# Patient Record
Sex: FEMALE | Race: WHITE | Employment: FULL TIME | URBAN - METROPOLITAN AREA
[De-identification: names, ages, dates, MRNs, and addresses within clinical notes are randomized per-mention and may not be internally consistent; named-entity substitution may affect disease eponyms.]

---

## 2017-05-17 LAB
ABO/RH: NORMAL
ANTIBODY SCREEN: NORMAL
HEPATITIS C ANTIBODY INTERPRETATION: NORMAL

## 2017-05-18 ENCOUNTER — HOSPITAL ENCOUNTER (OUTPATIENT)
Dept: SURGERY | Age: 27
Discharge: HOME | End: 2017-05-18
Attending: OBSTETRICS & GYNECOLOGY | Admitting: OBSTETRICS & GYNECOLOGY

## 2017-05-18 VITALS
HEART RATE: 89 BPM | SYSTOLIC BLOOD PRESSURE: 109 MMHG | DIASTOLIC BLOOD PRESSURE: 77 MMHG | OXYGEN SATURATION: 97 % | TEMPERATURE: 97.8 F | RESPIRATION RATE: 18 BRPM

## 2017-05-18 LAB
BASOPHILS ABSOLUTE: 0 K/UL (ref 0–0.2)
BASOPHILS RELATIVE PERCENT: 0.4 %
EOSINOPHILS ABSOLUTE: 0.2 K/UL (ref 0–0.6)
EOSINOPHILS RELATIVE PERCENT: 2.6 %
HCT VFR BLD CALC: 39 % (ref 36–48)
HEMOGLOBIN: 12.5 G/DL (ref 12–16)
HEPATITIS B SURFACE ANTIGEN INTERPRETATION: ABNORMAL
LYMPHOCYTES ABSOLUTE: 1.9 K/UL (ref 1–5.1)
LYMPHOCYTES RELATIVE PERCENT: 30.5 %
MCH RBC QN AUTO: 27.9 PG (ref 26–34)
MCHC RBC AUTO-ENTMCNC: 32 G/DL (ref 31–36)
MCV RBC AUTO: 87.1 FL (ref 80–100)
MONOCYTES ABSOLUTE: 0.5 K/UL (ref 0–1.3)
MONOCYTES RELATIVE PERCENT: 8.4 %
NEUTROPHILS ABSOLUTE: 3.6 K/UL (ref 1.7–7.7)
NEUTROPHILS RELATIVE PERCENT: 58.1 %
PDW BLD-RTO: 17.8 % (ref 12.4–15.4)
PLATELET # BLD: 242 K/UL (ref 135–450)
PMV BLD AUTO: 8.6 FL (ref 5–10.5)
RBC # BLD: 4.48 M/UL (ref 4–5.2)
RPR: ABNORMAL
RUBELLA ANTIBODY IGG: 23 IU/ML
WBC # BLD: 6.1 K/UL (ref 4–11)

## 2017-05-18 RX ORDER — DIPHENHYDRAMINE HYDROCHLORIDE 50 MG/ML
12.5 INJECTION INTRAMUSCULAR; INTRAVENOUS
Status: ACTIVE | OUTPATIENT
Start: 2017-05-18 | End: 2017-05-18

## 2017-05-18 RX ORDER — MEPERIDINE HYDROCHLORIDE 25 MG/ML
12.5 INJECTION INTRAMUSCULAR; INTRAVENOUS; SUBCUTANEOUS EVERY 5 MIN PRN
Status: DISCONTINUED | OUTPATIENT
Start: 2017-05-18 | End: 2017-05-19 | Stop reason: HOSPADM

## 2017-05-18 RX ORDER — IBUPROFEN 800 MG/1
800 TABLET ORAL EVERY 6 HOURS PRN
Qty: 40 TABLET | Refills: 3 | Status: ON HOLD | OUTPATIENT
Start: 2017-05-18 | End: 2020-08-21

## 2017-05-18 RX ORDER — LIDOCAINE HYDROCHLORIDE 10 MG/ML
0.5 INJECTION, SOLUTION EPIDURAL; INFILTRATION; INTRACAUDAL; PERINEURAL ONCE
Status: DISCONTINUED | OUTPATIENT
Start: 2017-05-18 | End: 2017-05-19 | Stop reason: HOSPADM

## 2017-05-18 RX ORDER — METHYLERGONOVINE MALEATE 0.2 MG/1
0.2 TABLET ORAL 3 TIMES DAILY
Qty: 15 TABLET | Refills: 0 | Status: ON HOLD | OUTPATIENT
Start: 2017-05-18 | End: 2020-08-21

## 2017-05-18 RX ORDER — SODIUM CHLORIDE, SODIUM LACTATE, POTASSIUM CHLORIDE, CALCIUM CHLORIDE 600; 310; 30; 20 MG/100ML; MG/100ML; MG/100ML; MG/100ML
INJECTION, SOLUTION INTRAVENOUS CONTINUOUS
Status: DISCONTINUED | OUTPATIENT
Start: 2017-05-18 | End: 2017-05-19 | Stop reason: HOSPADM

## 2017-05-18 RX ORDER — CEFAZOLIN SODIUM 2 G/100ML
2 INJECTION, SOLUTION INTRAVENOUS
Status: COMPLETED | OUTPATIENT
Start: 2017-05-18 | End: 2017-05-18

## 2017-05-18 RX ORDER — OXYCODONE HYDROCHLORIDE AND ACETAMINOPHEN 5; 325 MG/1; MG/1
1 TABLET ORAL PRN
Status: COMPLETED | OUTPATIENT
Start: 2017-05-18 | End: 2017-05-18

## 2017-05-18 RX ORDER — HYDROMORPHONE HCL 110MG/55ML
0.5 PATIENT CONTROLLED ANALGESIA SYRINGE INTRAVENOUS EVERY 5 MIN PRN
Status: DISCONTINUED | OUTPATIENT
Start: 2017-05-18 | End: 2017-05-19 | Stop reason: HOSPADM

## 2017-05-18 RX ORDER — ONDANSETRON 2 MG/ML
4 INJECTION INTRAMUSCULAR; INTRAVENOUS
Status: ACTIVE | OUTPATIENT
Start: 2017-05-18 | End: 2017-05-18

## 2017-05-18 RX ORDER — OXYCODONE HYDROCHLORIDE AND ACETAMINOPHEN 5; 325 MG/1; MG/1
1 TABLET ORAL EVERY 6 HOURS PRN
Qty: 12 TABLET | Refills: 0 | Status: SHIPPED | OUTPATIENT
Start: 2017-05-18 | End: 2017-05-25

## 2017-05-18 RX ORDER — FENTANYL CITRATE 50 UG/ML
25 INJECTION, SOLUTION INTRAMUSCULAR; INTRAVENOUS EVERY 5 MIN PRN
Status: DISCONTINUED | OUTPATIENT
Start: 2017-05-18 | End: 2017-05-19 | Stop reason: HOSPADM

## 2017-05-18 RX ORDER — LABETALOL HYDROCHLORIDE 5 MG/ML
5 INJECTION, SOLUTION INTRAVENOUS EVERY 10 MIN PRN
Status: DISCONTINUED | OUTPATIENT
Start: 2017-05-18 | End: 2017-05-19 | Stop reason: HOSPADM

## 2017-05-18 RX ORDER — OXYCODONE HYDROCHLORIDE AND ACETAMINOPHEN 5; 325 MG/1; MG/1
2 TABLET ORAL PRN
Status: COMPLETED | OUTPATIENT
Start: 2017-05-18 | End: 2017-05-18

## 2017-05-18 RX ADMIN — CEFAZOLIN SODIUM 2 G: 2 INJECTION, SOLUTION INTRAVENOUS at 13:33

## 2017-05-18 RX ADMIN — SODIUM CHLORIDE, SODIUM LACTATE, POTASSIUM CHLORIDE, CALCIUM CHLORIDE: 600; 310; 30; 20 INJECTION, SOLUTION INTRAVENOUS at 12:46

## 2017-05-18 RX ADMIN — OXYCODONE HYDROCHLORIDE AND ACETAMINOPHEN 1 TABLET: 5; 325 TABLET ORAL at 15:06

## 2017-05-18 ASSESSMENT — PAIN DESCRIPTION - DESCRIPTORS: DESCRIPTORS: CONSTANT;CRAMPING

## 2017-05-18 ASSESSMENT — PAIN SCALES - GENERAL: PAINLEVEL_OUTOF10: 4

## 2017-05-18 ASSESSMENT — PAIN - FUNCTIONAL ASSESSMENT: PAIN_FUNCTIONAL_ASSESSMENT: 0-10

## 2017-05-18 ASSESSMENT — PAIN DESCRIPTION - PAIN TYPE: TYPE: SURGICAL PAIN

## 2017-05-19 LAB — HIV-1 AND HIV-2 ANTIBODIES: NEGATIVE

## 2017-07-03 ENCOUNTER — HOSPITAL ENCOUNTER (OUTPATIENT)
Dept: OTHER | Age: 27
Discharge: HOME | End: 2017-07-03
Attending: OBSTETRICS & GYNECOLOGY | Admitting: OBSTETRICS & GYNECOLOGY

## 2017-07-03 LAB — HOMOCYSTEINE: 8 UMOL/L (ref 0–10)

## 2017-07-05 LAB
ACTIVATED PROTEIN C RESISTANCE: 4.6
ANTICARDIOLIPIN IGG ANTIBODY: 6 GPL (ref 0–14)
ANTITHROMBIN ACTIVITY: 107 % (ref 76–128)
CARDIOLIPIN AB IGM: 6 MPL (ref 0–12)
DRVVT CONFIRMATION TEST: NORMAL RATIO
DRVVT SCREEN: 38 SEC (ref 33–44)
DRVVT,DIL: NORMAL SEC (ref 33–44)
HEXAGONAL PHOSPHOLIPID NEUTRALIZAT TEST: NORMAL
LUPUS ANTICOAG INTERP: NORMAL
PLT NEUTA: NORMAL
PROTEIN C FUNCTIONAL: 123 % (ref 83–168)
PROTEIN S ANTIGEN, FREE: 94 % (ref 55–123)
PROTEIN S, FUNCTIONAL: 101 % (ref 57–131)
PT D: 13.1 SEC (ref 12–15.5)
PTT D: 38 SEC (ref 32–48)
PTT-D CORR REFLEX: NORMAL SEC (ref 32–48)
PTT-HEPARIN NEUTRALIZED: NORMAL SEC (ref 32–48)
REPTILASE TIME: NORMAL SEC
THROMBIN TIME: NORMAL SEC (ref 14.7–19.5)

## 2017-07-07 LAB
PROTHROMBIN G20210A MUTATION: NEGATIVE
PT PCR SPECIMEN: NORMAL

## 2017-07-09 LAB
FACTOR V LEIDEN: NEGATIVE
SPECIMEN: NORMAL

## 2017-07-11 LAB — REPORT: NORMAL

## 2020-01-07 LAB
ABO/RH: NORMAL
ANTIBODY SCREEN: NORMAL
HEPATITIS C ANTIBODY INTERPRETATION: NORMAL

## 2020-01-08 LAB
BASOPHILS ABSOLUTE: 0 K/UL (ref 0–0.2)
BASOPHILS RELATIVE PERCENT: 0.3 %
EOSINOPHILS ABSOLUTE: 0.1 K/UL (ref 0–0.6)
EOSINOPHILS RELATIVE PERCENT: 1.5 %
HCT VFR BLD CALC: 37.5 % (ref 36–48)
HEMOGLOBIN: 12.9 G/DL (ref 12–16)
HEPATITIS B SURFACE ANTIGEN INTERPRETATION: NORMAL
HIV AG/AB: NORMAL
HIV ANTIGEN: NORMAL
HIV-1 ANTIBODY: NORMAL
HIV-2 AB: NORMAL
LYMPHOCYTES ABSOLUTE: 2.3 K/UL (ref 1–5.1)
LYMPHOCYTES RELATIVE PERCENT: 30.8 %
MCH RBC QN AUTO: 29.4 PG (ref 26–34)
MCHC RBC AUTO-ENTMCNC: 34.4 G/DL (ref 31–36)
MCV RBC AUTO: 85.5 FL (ref 80–100)
MONOCYTES ABSOLUTE: 0.5 K/UL (ref 0–1.3)
MONOCYTES RELATIVE PERCENT: 6.8 %
NEUTROPHILS ABSOLUTE: 4.5 K/UL (ref 1.7–7.7)
NEUTROPHILS RELATIVE PERCENT: 60.6 %
PDW BLD-RTO: 14.2 % (ref 12.4–15.4)
PLATELET # BLD: 276 K/UL (ref 135–450)
PMV BLD AUTO: 8.1 FL (ref 5–10.5)
RBC # BLD: 4.39 M/UL (ref 4–5.2)
RUBELLA ANTIBODY IGG: 21.1 IU/ML
TOTAL SYPHILLIS IGG/IGM: NORMAL
WBC # BLD: 7.4 K/UL (ref 4–11)

## 2020-01-09 LAB — URINE CULTURE, ROUTINE: NORMAL

## 2020-05-13 ENCOUNTER — ROUTINE PRENATAL (OUTPATIENT)
Dept: PERINATAL CARE | Age: 30
End: 2020-05-13
Payer: COMMERCIAL

## 2020-05-13 VITALS
WEIGHT: 194.6 LBS | HEART RATE: 105 BPM | BODY MASS INDEX: 32.38 KG/M2 | DIASTOLIC BLOOD PRESSURE: 79 MMHG | SYSTOLIC BLOOD PRESSURE: 132 MMHG

## 2020-05-13 PROCEDURE — 76811 OB US DETAILED SNGL FETUS: CPT

## 2020-05-13 PROCEDURE — 76825 ECHO EXAM OF FETAL HEART: CPT

## 2020-05-13 PROCEDURE — 76827 ECHO EXAM OF FETAL HEART: CPT

## 2020-05-13 PROCEDURE — 93325 DOPPLER ECHO COLOR FLOW MAPG: CPT

## 2020-05-13 NOTE — PROGRESS NOTES
Level II US and consult for fetal echo, FOB-VSD. Reports decreased fetal movement, denies bleeding, leaking of fluid or ctx.  Temp 98.4

## 2020-06-10 LAB
GLUCOSE CHALLENGE: 161 MG/DL
HCT VFR BLD CALC: 29.8 % (ref 36–48)
HEMOGLOBIN: 9.7 G/DL (ref 12–16)
MCH RBC QN AUTO: 25.7 PG (ref 26–34)
MCHC RBC AUTO-ENTMCNC: 32.6 G/DL (ref 31–36)
MCV RBC AUTO: 78.8 FL (ref 80–100)
PDW BLD-RTO: 16 % (ref 12.4–15.4)
PLATELET # BLD: 263 K/UL (ref 135–450)
PMV BLD AUTO: 8 FL (ref 5–10.5)
RBC # BLD: 3.79 M/UL (ref 4–5.2)
WBC # BLD: 10.7 K/UL (ref 4–11)

## 2020-06-16 ENCOUNTER — HOSPITAL ENCOUNTER (OUTPATIENT)
Dept: OTHER | Age: 30
Discharge: HOME | End: 2020-06-16
Payer: COMMERCIAL

## 2020-06-16 LAB
GLUCOSE FASTING: 75 MG/DL
GLUCOSE TOLERANCE TEST 1 HOUR: 150 MG/DL
GLUCOSE TOLERANCE TEST 2 HOUR: 133 MG/DL
GLUCOSE TOLERANCE TEST 3 HOUR: 127 MG/DL

## 2020-06-16 PROCEDURE — 82952 GTT-ADDED SAMPLES: CPT

## 2020-06-16 PROCEDURE — 36415 COLL VENOUS BLD VENIPUNCTURE: CPT

## 2020-06-16 PROCEDURE — 82951 GLUCOSE TOLERANCE TEST (GTT): CPT

## 2020-08-01 LAB — GBS, EXTERNAL RESULT: POSITIVE

## 2020-08-21 ENCOUNTER — ANESTHESIA EVENT (OUTPATIENT)
Dept: LABOR AND DELIVERY | Age: 30
End: 2020-08-21
Payer: COMMERCIAL

## 2020-08-21 ENCOUNTER — ANESTHESIA (OUTPATIENT)
Dept: LABOR AND DELIVERY | Age: 30
End: 2020-08-21
Payer: COMMERCIAL

## 2020-08-21 ENCOUNTER — HOSPITAL ENCOUNTER (INPATIENT)
Age: 30
LOS: 3 days | Discharge: HOME | End: 2020-08-24
Attending: OBSTETRICS & GYNECOLOGY | Admitting: OBSTETRICS & GYNECOLOGY
Payer: COMMERCIAL

## 2020-08-21 LAB
ABO/RH: NORMAL
AMPHETAMINE SCREEN, URINE: NORMAL
ANISOCYTOSIS: ABNORMAL
ANTIBODY SCREEN: NORMAL
BANDED NEUTROPHILS RELATIVE PERCENT: 3 % (ref 0–7)
BARBITURATE SCREEN URINE: NORMAL
BASOPHILS ABSOLUTE: 0 K/UL (ref 0–0.2)
BASOPHILS RELATIVE PERCENT: 0 %
BENZODIAZEPINE SCREEN, URINE: NORMAL
BUPRENORPHINE URINE: NORMAL
CANNABINOID SCREEN URINE: NORMAL
COCAINE METABOLITE SCREEN URINE: NORMAL
EOSINOPHILS ABSOLUTE: 0 K/UL (ref 0–0.6)
EOSINOPHILS RELATIVE PERCENT: 0 %
HCT VFR BLD CALC: 33.7 % (ref 36–48)
HEMOGLOBIN: 10.6 G/DL (ref 12–16)
LYMPHOCYTES ABSOLUTE: 2 K/UL (ref 1–5.1)
LYMPHOCYTES RELATIVE PERCENT: 20 %
Lab: NORMAL
MCH RBC QN AUTO: 23.6 PG (ref 26–34)
MCHC RBC AUTO-ENTMCNC: 31.5 G/DL (ref 31–36)
MCV RBC AUTO: 75 FL (ref 80–100)
METAMYELOCYTES RELATIVE PERCENT: 2 %
METHADONE SCREEN, URINE: NORMAL
MICROCYTES: ABNORMAL
MONOCYTES ABSOLUTE: 0.7 K/UL (ref 0–1.3)
MONOCYTES RELATIVE PERCENT: 7 %
NEUTROPHILS ABSOLUTE: 7.3 K/UL (ref 1.7–7.7)
NEUTROPHILS RELATIVE PERCENT: 68 %
NUCLEATED RED BLOOD CELLS: 1 /100 WBC
NUCLEATED RED BLOOD CELLS: 1 /100 WBC
OPIATE SCREEN URINE: NORMAL
OXYCODONE URINE: NORMAL
PDW BLD-RTO: 19.5 % (ref 12.4–15.4)
PH UA: 5
PHENCYCLIDINE SCREEN URINE: NORMAL
PLATELET # BLD: 248 K/UL (ref 135–450)
PMV BLD AUTO: 8.3 FL (ref 5–10.5)
POLYCHROMASIA: ABNORMAL
PROPOXYPHENE SCREEN: NORMAL
RBC # BLD: 4.49 M/UL (ref 4–5.2)
WBC # BLD: 10 K/UL (ref 4–11)

## 2020-08-21 PROCEDURE — 2580000003 HC RX 258: Performed by: OBSTETRICS & GYNECOLOGY

## 2020-08-21 PROCEDURE — 85025 COMPLETE CBC W/AUTO DIFF WBC: CPT

## 2020-08-21 PROCEDURE — 86780 TREPONEMA PALLIDUM: CPT

## 2020-08-21 PROCEDURE — 6360000002 HC RX W HCPCS: Performed by: OBSTETRICS & GYNECOLOGY

## 2020-08-21 PROCEDURE — 80307 DRUG TEST PRSMV CHEM ANLYZR: CPT

## 2020-08-21 PROCEDURE — 86900 BLOOD TYPING SEROLOGIC ABO: CPT

## 2020-08-21 PROCEDURE — 1220000000 HC SEMI PRIVATE OB R&B

## 2020-08-21 PROCEDURE — 6370000000 HC RX 637 (ALT 250 FOR IP): Performed by: OBSTETRICS & GYNECOLOGY

## 2020-08-21 PROCEDURE — 86850 RBC ANTIBODY SCREEN: CPT

## 2020-08-21 PROCEDURE — 6370000000 HC RX 637 (ALT 250 FOR IP)

## 2020-08-21 PROCEDURE — 86901 BLOOD TYPING SEROLOGIC RH(D): CPT

## 2020-08-21 PROCEDURE — 6360000002 HC RX W HCPCS

## 2020-08-21 PROCEDURE — 3E0P7VZ INTRODUCTION OF HORMONE INTO FEMALE REPRODUCTIVE, VIA NATURAL OR ARTIFICIAL OPENING: ICD-10-PCS | Performed by: OBSTETRICS & GYNECOLOGY

## 2020-08-21 RX ORDER — DOCUSATE SODIUM 100 MG/1
100 CAPSULE, LIQUID FILLED ORAL 2 TIMES DAILY
Status: DISCONTINUED | OUTPATIENT
Start: 2020-08-21 | End: 2020-08-22

## 2020-08-21 RX ORDER — SODIUM CHLORIDE, SODIUM LACTATE, POTASSIUM CHLORIDE, CALCIUM CHLORIDE 600; 310; 30; 20 MG/100ML; MG/100ML; MG/100ML; MG/100ML
INJECTION, SOLUTION INTRAVENOUS CONTINUOUS
Status: DISCONTINUED | OUTPATIENT
Start: 2020-08-21 | End: 2020-08-22

## 2020-08-21 RX ORDER — FERROUS SULFATE 325(65) MG
325 TABLET ORAL
COMMUNITY

## 2020-08-21 RX ORDER — ONDANSETRON 2 MG/ML
4 INJECTION INTRAMUSCULAR; INTRAVENOUS EVERY 6 HOURS PRN
Status: DISCONTINUED | OUTPATIENT
Start: 2020-08-21 | End: 2020-08-22

## 2020-08-21 RX ORDER — TERBUTALINE SULFATE 1 MG/ML
0.25 INJECTION, SOLUTION SUBCUTANEOUS ONCE
Status: DISCONTINUED | OUTPATIENT
Start: 2020-08-21 | End: 2020-08-22

## 2020-08-21 RX ORDER — ACETAMINOPHEN 325 MG/1
650 TABLET ORAL EVERY 4 HOURS PRN
Status: DISCONTINUED | OUTPATIENT
Start: 2020-08-21 | End: 2020-08-22

## 2020-08-21 RX ORDER — SODIUM CHLORIDE 0.9 % (FLUSH) 0.9 %
10 SYRINGE (ML) INJECTION PRN
Status: DISCONTINUED | OUTPATIENT
Start: 2020-08-21 | End: 2020-08-22

## 2020-08-21 RX ORDER — METHYLERGONOVINE MALEATE 0.2 MG/ML
200 INJECTION INTRAVENOUS PRN
Status: DISCONTINUED | OUTPATIENT
Start: 2020-08-21 | End: 2020-08-22

## 2020-08-21 RX ORDER — BUTORPHANOL TARTRATE 1 MG/ML
1 INJECTION, SOLUTION INTRAMUSCULAR; INTRAVENOUS
Status: COMPLETED | OUTPATIENT
Start: 2020-08-21 | End: 2020-08-22

## 2020-08-21 RX ORDER — CARBOPROST TROMETHAMINE 250 UG/ML
250 INJECTION, SOLUTION INTRAMUSCULAR PRN
Status: DISCONTINUED | OUTPATIENT
Start: 2020-08-21 | End: 2020-08-22

## 2020-08-21 RX ADMIN — Medication 25 MCG: at 14:45

## 2020-08-21 RX ADMIN — Medication 1 MILLI-UNITS/MIN: at 20:33

## 2020-08-21 RX ADMIN — Medication 25 MCG: at 10:45

## 2020-08-21 RX ADMIN — BUTORPHANOL TARTRATE 1 MG: 1 INJECTION, SOLUTION INTRAMUSCULAR; INTRAVENOUS at 20:29

## 2020-08-21 RX ADMIN — SODIUM CHLORIDE, POTASSIUM CHLORIDE, SODIUM LACTATE AND CALCIUM CHLORIDE: 600; 310; 30; 20 INJECTION, SOLUTION INTRAVENOUS at 21:46

## 2020-08-21 RX ADMIN — CEFAZOLIN SODIUM 1 G: 1 INJECTION, POWDER, FOR SOLUTION INTRAMUSCULAR; INTRAVENOUS at 18:50

## 2020-08-21 RX ADMIN — SODIUM CHLORIDE, POTASSIUM CHLORIDE, SODIUM LACTATE AND CALCIUM CHLORIDE: 600; 310; 30; 20 INJECTION, SOLUTION INTRAVENOUS at 15:11

## 2020-08-21 RX ADMIN — SODIUM CHLORIDE, POTASSIUM CHLORIDE, SODIUM LACTATE AND CALCIUM CHLORIDE: 600; 310; 30; 20 INJECTION, SOLUTION INTRAVENOUS at 09:18

## 2020-08-21 RX ADMIN — CEFAZOLIN SODIUM 2 G: 10 INJECTION, POWDER, FOR SOLUTION INTRAVENOUS at 10:08

## 2020-08-21 ASSESSMENT — PAIN SCALES - GENERAL: PAINLEVEL_OUTOF10: 5

## 2020-08-21 NOTE — H&P
Department of Obstetrics and Gynecology   Obstetrics History and Physical        CHIEF COMPLAINT:  induction    HISTORY OF PRESENT ILLNESS:      The patient is a 31 y.o. female at 44w2d.   OB History        3    Para        Term                AB   2    Living           SAB   2    TAB        Ectopic        Molar        Multiple        Live Births                Patient presents with a chief complaint as above and is being admitted for induction    Estimated Due Date: Estimated Date of Delivery: 20    PRENATAL CARE:    Complicated by: GBS pos    PAST OB HISTORY:  OB History        3    Para        Term                AB   2    Living           SAB   2    TAB        Ectopic        Molar        Multiple        Live Births                    Past Medical History:        Diagnosis Date    Anemia      Past Surgical History:        Procedure Laterality Date    DILATION AND CURETTAGE OF UTERUS N/A 2017    SUCTION DILATATION AND CURETTAGE      TONSILLECTOMY       Allergies:  Amoxicillin    Social History:    Social History     Socioeconomic History    Marital status: Single     Spouse name: Not on file    Number of children: Not on file    Years of education: Not on file    Highest education level: Not on file   Occupational History    Not on file   Social Needs    Financial resource strain: Not on file    Food insecurity     Worry: Not on file     Inability: Not on file    Transportation needs     Medical: Not on file     Non-medical: Not on file   Tobacco Use    Smoking status: Never Smoker    Smokeless tobacco: Never Used   Substance and Sexual Activity    Alcohol use: No    Drug use: No    Sexual activity: Not on file   Lifestyle    Physical activity     Days per week: Not on file     Minutes per session: Not on file    Stress: Not on file   Relationships    Social connections     Talks on phone: Not on file     Gets together: Not on file     Attends [employer] service: Not on file     Active member of club or organization: Not on file     Attends meetings of clubs or organizations: Not on file     Relationship status: Not on file    Intimate partner violence     Fear of current or ex partner: Not on file     Emotionally abused: Not on file     Physically abused: Not on file     Forced sexual activity: Not on file   Other Topics Concern    Not on file   Social History Narrative    Not on file     Family History:       Problem Relation Age of Onset    Diabetes Paternal Grandfather     Cancer Paternal Grandmother         breast    Diabetes Father     Cancer Paternal Aunt         cervical    Cancer Paternal Aunt      Medications Prior to Admission:  Medications Prior to Admission: ferrous sulfate (IRON 325) 325 (65 Fe) MG tablet, Take 325 mg by mouth daily (with breakfast)  Prenatal MV-Min-Fe Fum-FA-DHA (PRENATAL 1 PO), Take 1 tablet by mouth daily Takes 4 gummies daily which is equivalent to 1 PNV  [DISCONTINUED] ibuprofen (ADVIL;MOTRIN) 800 MG tablet, Take 1 tablet by mouth every 6 hours as needed for Pain  [DISCONTINUED] methylergonovine (METHERGINE) 0.2 MG tablet, Take 1 tablet by mouth 3 times daily  [DISCONTINUED] ibuprofen (IBU) 600 MG tablet, Take 1 tablet by mouth every 6 hours as needed for Pain for 20 doses. REVIEW OF SYSTEMS:    CONSTITUTIONAL:  negative  RESPIRATORY:  negative  CARDIOVASCULAR:  negative  GASTROINTESTINAL:  negative  ALLERGIC/IMMUNOLOGIC:  negative  NEUROLOGICAL:  negative  BEHAVIOR/PSYCH:  negative    PHYSICAL EXAM:  Vitals:    08/21/20 0927 08/21/20 0929   BP: 131/76 131/76   Pulse:  97   Resp: 18    Temp: 98.1 °F (36.7 °C)    TempSrc: Oral    Weight: 221 lb (100.2 kg)    Height: 5' 6\" (1.676 m)      General appearance:  awake, alert, cooperative, no apparent distress, and appears stated age  Neurologic:  Awake, alert, oriented to name, place and time.     Lungs:  No increased work of breathing, good air exchange  Abdomen:  Soft, non tender, gravid, consistent with her gestational age, EFW by Raphaelld's manouever was 3700 gms   Fetal heart rate:  Reassuring.   Pelvis:  Adequate pelvis  Cervix: 1 cm 50% medium -3  Contraction frequency:  irregular    Membranes:  Intact    ASSESSMENT AND PLAN:    Labor: Admit, anticipate normal delivery, routine labor orders  Fetus: Reassuring  GBS: Yes  Other: IV antibiotic therapy, cytotec, pitocin

## 2020-08-21 NOTE — ANESTHESIA PRE PROCEDURE
Diagnosis Date    Anemia        Past Surgical History:        Procedure Laterality Date    DILATION AND CURETTAGE OF UTERUS N/A 05/18/2017    SUCTION DILATATION AND CURETTAGE      TONSILLECTOMY         Social History:    Social History     Tobacco Use    Smoking status: Never Smoker    Smokeless tobacco: Never Used   Substance Use Topics    Alcohol use: No                                Counseling given: Not Answered      Vital Signs (Current):   Vitals:    08/21/20 0927   BP: 131/76   Resp: 18   Temp: 36.7 °C (98.1 °F)   TempSrc: Oral   Weight: 221 lb (100.2 kg)   Height: 5' 6\" (1.676 m)                                              BP Readings from Last 3 Encounters:   08/21/20 131/76   05/13/20 132/79   05/18/17 119/83       NPO Status: Time of last liquid consumption: 0730                        Time of last solid consumption: 0730                        Date of last liquid consumption: 08/21/20                        Date of last solid food consumption: 08/21/20    BMI:   Wt Readings from Last 3 Encounters:   08/21/20 221 lb (100.2 kg)   05/13/20 194 lb 9.6 oz (88.3 kg)   05/18/17 160 lb (72.6 kg)     Body mass index is 35.67 kg/m². CBC:   Lab Results   Component Value Date    WBC 10.0 08/21/2020    RBC 4.49 08/21/2020    HGB 10.6 08/21/2020    HCT 33.7 08/21/2020    MCV 75.0 08/21/2020    RDW 19.5 08/21/2020     08/21/2020       CMP: No results found for: NA, K, CL, CO2, BUN, CREATININE, GFRAA, AGRATIO, LABGLOM, GLUCOSE, PROT, CALCIUM, BILITOT, ALKPHOS, AST, ALT    POC Tests: No results for input(s): POCGLU, POCNA, POCK, POCCL, POCBUN, POCHEMO, POCHCT in the last 72 hours.     Coags: No results found for: PROTIME, INR, APTT    HCG (If Applicable): No results found for: PREGTESTUR, PREGSERUM, HCG, HCGQUANT     ABGs: No results found for: PHART, PO2ART, NVY8AFQ, NRF6TCU, BEART, L3OGZBTY     Type & Screen (If Applicable):  Lab Results   Component Value Date    LABABO O 01/17/2012    [facility] Positive 01/17/2012       Drug/Infectious Status (If Applicable):  No results found for: HIV, HEPCAB    COVID-19 Screening (If Applicable): No results found for: COVID19      Anesthesia Evaluation  Patient summary reviewed and Nursing notes reviewed no history of anesthetic complications:   Airway: Mallampati: II  TM distance: >3 FB   Neck ROM: full  Mouth opening: > = 3 FB Dental: normal exam         Pulmonary:Negative Pulmonary ROS and normal exam                               Cardiovascular:Negative CV ROS            Rhythm: regular  Rate: normal                    Neuro/Psych:   Negative Neuro/Psych ROS              GI/Hepatic/Renal: Neg GI/Hepatic/Renal ROS            Endo/Other: Negative Endo/Other ROS                    Abdominal:           Vascular: negative vascular ROS. Anesthesia Plan      epidural     ASA 2     (Patient request epidural for labor and delivery. Discussed procedure and risks including but not limited to changes in VSS, allergic reaction, infection, intravascular injection, paresthesia, n/v, severe headache, temporary or permanent rare neurologic sequelae and failed block. All questions answered. Patient agreed to proceed)        Anesthetic plan and risks discussed with patient. Use of blood products discussed with patient whom consented to blood products. Plan discussed with CRNA and attending.     Attending anesthesiologist reviewed and agrees with Pre Eval content              BUSHRA Carbone - CRNA   8/21/2020

## 2020-08-22 LAB — TOTAL SYPHILLIS IGG/IGM: NORMAL

## 2020-08-22 PROCEDURE — 0KQM0ZZ REPAIR PERINEUM MUSCLE, OPEN APPROACH: ICD-10-PCS | Performed by: OBSTETRICS & GYNECOLOGY

## 2020-08-22 PROCEDURE — 7200000001 HC VAGINAL DELIVERY

## 2020-08-22 PROCEDURE — 6360000002 HC RX W HCPCS: Performed by: OBSTETRICS & GYNECOLOGY

## 2020-08-22 PROCEDURE — 2580000003 HC RX 258: Performed by: OBSTETRICS & GYNECOLOGY

## 2020-08-22 PROCEDURE — 10907ZC DRAINAGE OF AMNIOTIC FLUID, THERAPEUTIC FROM PRODUCTS OF CONCEPTION, VIA NATURAL OR ARTIFICIAL OPENING: ICD-10-PCS | Performed by: OBSTETRICS & GYNECOLOGY

## 2020-08-22 PROCEDURE — 2500000003 HC RX 250 WO HCPCS: Performed by: NURSE ANESTHETIST, CERTIFIED REGISTERED

## 2020-08-22 PROCEDURE — 6370000000 HC RX 637 (ALT 250 FOR IP): Performed by: OBSTETRICS & GYNECOLOGY

## 2020-08-22 PROCEDURE — 1200000000 HC SEMI PRIVATE

## 2020-08-22 PROCEDURE — 3700000025 EPIDURAL BLOCK: Performed by: ANESTHESIOLOGY

## 2020-08-22 PROCEDURE — 3E033VJ INTRODUCTION OF OTHER HORMONE INTO PERIPHERAL VEIN, PERCUTANEOUS APPROACH: ICD-10-PCS | Performed by: OBSTETRICS & GYNECOLOGY

## 2020-08-22 RX ORDER — NALBUPHINE HCL 10 MG/ML
10 AMPUL (ML) INJECTION
Status: DISCONTINUED | OUTPATIENT
Start: 2020-08-22 | End: 2020-08-22 | Stop reason: RX

## 2020-08-22 RX ORDER — DOCUSATE SODIUM 100 MG/1
100 CAPSULE, LIQUID FILLED ORAL 2 TIMES DAILY
Status: DISCONTINUED | OUTPATIENT
Start: 2020-08-22 | End: 2020-08-24 | Stop reason: HOSPADM

## 2020-08-22 RX ORDER — ACETAMINOPHEN 325 MG/1
650 TABLET ORAL EVERY 4 HOURS PRN
Status: DISCONTINUED | OUTPATIENT
Start: 2020-08-22 | End: 2020-08-24 | Stop reason: HOSPADM

## 2020-08-22 RX ORDER — IBUPROFEN 800 MG/1
800 TABLET ORAL EVERY 8 HOURS
Status: DISCONTINUED | OUTPATIENT
Start: 2020-08-22 | End: 2020-08-24 | Stop reason: HOSPADM

## 2020-08-22 RX ORDER — LIDOCAINE HYDROCHLORIDE 15 MG/ML
INJECTION, SOLUTION EPIDURAL; INFILTRATION; INTRACAUDAL; PERINEURAL PRN
Status: DISCONTINUED | OUTPATIENT
Start: 2020-08-22 | End: 2020-08-22 | Stop reason: SDUPTHER

## 2020-08-22 RX ORDER — SODIUM CHLORIDE 0.9 % (FLUSH) 0.9 %
10 SYRINGE (ML) INJECTION EVERY 12 HOURS SCHEDULED
Status: DISCONTINUED | OUTPATIENT
Start: 2020-08-22 | End: 2020-08-24 | Stop reason: HOSPADM

## 2020-08-22 RX ORDER — FENTANYL/BUPIVACAINE/NS/PF 2-1250MCG
12 PLASTIC BAG, INJECTION (ML) INJECTION CONTINUOUS
Status: DISCONTINUED | OUTPATIENT
Start: 2020-08-22 | End: 2020-08-22

## 2020-08-22 RX ORDER — SODIUM CHLORIDE 0.9 % (FLUSH) 0.9 %
10 SYRINGE (ML) INJECTION PRN
Status: DISCONTINUED | OUTPATIENT
Start: 2020-08-22 | End: 2020-08-24 | Stop reason: HOSPADM

## 2020-08-22 RX ORDER — IBUPROFEN 600 MG/1
600 TABLET ORAL EVERY 6 HOURS PRN
Qty: 30 TABLET | Refills: 1 | Status: SHIPPED | OUTPATIENT
Start: 2020-08-22

## 2020-08-22 RX ORDER — MODIFIED LANOLIN
OINTMENT (GRAM) TOPICAL PRN
Status: DISCONTINUED | OUTPATIENT
Start: 2020-08-22 | End: 2020-08-24 | Stop reason: HOSPADM

## 2020-08-22 RX ORDER — HYDROCODONE BITARTRATE AND ACETAMINOPHEN 5; 325 MG/1; MG/1
1 TABLET ORAL EVERY 6 HOURS PRN
Qty: 15 TABLET | Refills: 0 | Status: SHIPPED | OUTPATIENT
Start: 2020-08-22 | End: 2020-08-29

## 2020-08-22 RX ADMIN — Medication 12 ML/HR: at 03:04

## 2020-08-22 RX ADMIN — ACETAMINOPHEN 650 MG: 325 TABLET, FILM COATED ORAL at 11:43

## 2020-08-22 RX ADMIN — LIDOCAINE HYDROCHLORIDE 2 ML: 15 INJECTION, SOLUTION EPIDURAL; INFILTRATION; INTRACAUDAL; PERINEURAL at 03:01

## 2020-08-22 RX ADMIN — Medication 10 ML: at 20:32

## 2020-08-22 RX ADMIN — IBUPROFEN 800 MG: 800 TABLET, FILM COATED ORAL at 15:39

## 2020-08-22 RX ADMIN — SODIUM CHLORIDE, POTASSIUM CHLORIDE, SODIUM LACTATE AND CALCIUM CHLORIDE: 600; 310; 30; 20 INJECTION, SOLUTION INTRAVENOUS at 04:36

## 2020-08-22 RX ADMIN — IBUPROFEN 800 MG: 800 TABLET, FILM COATED ORAL at 23:41

## 2020-08-22 RX ADMIN — SODIUM CHLORIDE, POTASSIUM CHLORIDE, SODIUM LACTATE AND CALCIUM CHLORIDE: 600; 310; 30; 20 INJECTION, SOLUTION INTRAVENOUS at 02:55

## 2020-08-22 RX ADMIN — ACETAMINOPHEN 650 MG: 325 TABLET ORAL at 20:26

## 2020-08-22 RX ADMIN — SODIUM CHLORIDE, POTASSIUM CHLORIDE, SODIUM LACTATE AND CALCIUM CHLORIDE: 600; 310; 30; 20 INJECTION, SOLUTION INTRAVENOUS at 10:12

## 2020-08-22 RX ADMIN — DOCUSATE SODIUM 100 MG: 100 CAPSULE, LIQUID FILLED ORAL at 20:26

## 2020-08-22 RX ADMIN — ONDANSETRON 4 MG: 2 INJECTION INTRAMUSCULAR; INTRAVENOUS at 00:36

## 2020-08-22 RX ADMIN — CEFAZOLIN SODIUM 1 G: 1 INJECTION, POWDER, FOR SOLUTION INTRAMUSCULAR; INTRAVENOUS at 03:54

## 2020-08-22 RX ADMIN — BUTORPHANOL TARTRATE 1 MG: 1 INJECTION, SOLUTION INTRAMUSCULAR; INTRAVENOUS at 00:29

## 2020-08-22 RX ADMIN — LIDOCAINE HYDROCHLORIDE 3 ML: 15 INJECTION, SOLUTION EPIDURAL; INFILTRATION; INTRACAUDAL; PERINEURAL at 02:58

## 2020-08-22 ASSESSMENT — PAIN SCALES - GENERAL
PAINLEVEL_OUTOF10: 4
PAINLEVEL_OUTOF10: 2
PAINLEVEL_OUTOF10: 2
PAINLEVEL_OUTOF10: 0
PAINLEVEL_OUTOF10: 5

## 2020-08-22 NOTE — ANESTHESIA PROCEDURE NOTES
Epidural Block    Patient location during procedure: OB  Start time: 2020 2:46 AM  Reason for block: labor epidural  Staffing  Anesthesiologist: Salma Olguin MD  Resident/CRNA: BUSHRA Alvares - CRNA  Performed: resident/CRNA   Preanesthetic Checklist  Completed: patient identified, pre-op evaluation, timeout performed, IV checked, risks and benefits discussed, monitors and equipment checked, anesthesia consent given, oxygen available and patient being monitored  Epidural  Patient position: sitting  Prep: ChloraPrep and x2  Patient monitoring: cardiac monitor, continuous pulse ox and frequent blood pressure checks  Approach: midline  Location: lumbar (1-5)  Injection technique: SILVESTRE saline  Provider prep: mask and sterile gloves  Needle  Needle type: Tuohy   Needle gauge: 17 G  Needle length: 3.5 in  Needle insertion depth: 6 cm  Catheter type: side hole  Catheter size: 19 G (20 G)  Catheter at skin depth: 11 cm  Test dose: negative  Assessment  Sensory level: T10  Hemodynamics: stable  Attempts: 2  Additional Notes  Called for labor epidural analgesia request. Medical and Surgical history reviewed with pt. Risks/benefits of epidural discussed including allergic reaction, infection, bleeding, hypotension, headache, back pain, nerve damage, failed or one-sided block. Also discussed anesthesia options and associated risks in the event of . Questions answered. Verbalizes understanding and requests to proceed. FHR:  140  Pt in sitting position. Labor epidural placed using SILVESTRE sterile technique (donned mask and sterile gloves). Back prepped with Chloraprepx2. Sterile drape applied. Site: L3-4  SILVESTRE:   6 cm. Attempts:  2   Re-directs:  1  Site infiltrated with 1%Lidocaine. 17G Tuohy needle inserted, SILVESTRE technique with saline. No heme, CSF, or paresthesias noted. Epidural space dilated with saline. #25ga pencan needle placed through tuohy for dural puncture. +CSF -heme or paresthesia. Spinal needle removed. Threaded epidural catheter through Tuohy needle easily. Tuohy needle withdrawn. Test Dose:0256           Negative aspiration. 3cc of 1.5% Lido with epi 1:200,000 test dose given. Negative test dose. Skin:  11cm catheter taped at the skin. Secured with steri strips, tegaderm, and tape. Infusion:  .15% Ropivacaine with Fentanyl (2ug/cc)  Auto bolus 4 ml every 20 minutes. (Max. Dose- 40 ml/hr.)      Sensory Level:  R:  T10 L:  T10    VAS: start 6/10, end 0/10    Patient in supine position with left uterine displacement.  VSS:

## 2020-08-22 NOTE — PROGRESS NOTES
Contraction pain is none.  Epidural Yes    VS:   Vitals:    20 0533 20 0543 20 0558 20 0612   BP:  124/67 126/65 127/65   Pulse:  75 76 80   Resp:       Temp:       TempSrc:       SpO2: 96%      Weight:       Height:             FHT: Baseline: 150   Variability: moderate   Accelerations: Present   Decels: Occurs rare    AROM done- clear fl;uid,FSE, IUPC placed in    Cervical Exam:   Dilation (cm): (P) 3   Effacement (%): (P) 80   Cervical Characteristics: Soft, Mid-Position   Station: -3   Presentation: (P) Vertex    A/P: 31 y.o.  39w3d   · IOL: progressing well     · Fetus: reassuring  · GBS: yes- on PCN  · Other:

## 2020-08-22 NOTE — DISCHARGE SUMMARY
Obstetrical Discharge Form    Gestational Age: 39w3d    Antepartum complications: none    Date of Delivery: 20      Type of Delivery: vaginal, spontaneous    Delivered By: India Huber     Baby:      Information for the patient's :  Ti Bradford [[id number]]   APGAR One: 10     Information for the patient's :  Ti Bradford [[id number]]   APGAR Five: 10     Information for the patient's :  Ti Bradford [[id number]]   Birth Weight: 7 lb 8.3 oz (3.41 kg)       Anesthesia: Epidural    Intrapartum complications: None    Postpartum complications: none    Discharge Medication:    Humbertolydia August   Home Medication Instructions [id number]    Printed on:20 1158   Medication Information                      ferrous sulfate (IRON 325) 325 (65 Fe) MG tablet  Take 325 mg by mouth daily (with breakfast)             Prenatal MV-Min-Fe Fum-FA-DHA (PRENATAL 1 PO)  Take 1 tablet by mouth daily Takes 4 gummies daily which is equivalent to 1 PNV                  Discharge Condition:  good    Discharge Date:     PLAN:  Follow up in 6 weeks for routine PP visit  All questions answered  D/C summary begun at delivery for D/C planning purposes, any delay in discharge from ordered D/C date due to  factors.

## 2020-08-22 NOTE — L&D DELIVERY SUMMARY NOTE
Labor & Delivery Summary  Dilation Complete Date: 20  Dilation Complete Time: 1033    Pre-operative Diagnosis:  Term pregnancy and Induced labor    Post-operative Diagnosis:  Living  infant,  Male    Procedure:  Spontaneous vaginal delivery, Repair second degree spontaneous laceration    Surgeon:   Dr. Angelica Guillen for the patient's :  Anabel Alvarez [[id number]]          Anesthesia:  epidural anesthesia    Estimated blood loss:  300ml    Specimen:  Placenta not sent to pathology     Cord blood sent Yes    Complications:  none    Condition:  infant stable to general nursery    Details of Procedure: The patient is a 31 y.o. female at 38w3d   OB History        3    Para        Term                AB   2    Living           SAB   2    TAB        Ectopic        Molar        Multiple        Live Births                 who was admitted for induction. She received the following interventions: ARBOW, vaginal Cytotec and IV Pitocin induction The patient progressed well,did receive an epidural, became complete and started to push. After pushing for 10 min  the fetal head was at the perineum, nose and mouth suctioned with bulb suction and the rest of the infant delivered atraumatically, placed on mother abdomen. Cord was clamped and cut and infant handed off to the waiting nurse for evaluation. The placenta was delivered byspontaneous. The perineum and vagina were explored and a second degree laceration was repaired in standard fashion.

## 2020-08-22 NOTE — PROGRESS NOTES
Prescriptions for ibuprofen and Norco explained to pt with side effects and placed in pt binder. Pt and spouse state positive understanding.

## 2020-08-22 NOTE — PLAN OF CARE
Problem: Anxiety:  Goal: Level of anxiety will decrease  Description: Level of anxiety will decrease  Outcome: Ongoing     Problem: Breathing Pattern - Ineffective:  Goal: Able to breathe comfortably  Description: Able to breathe comfortably  Outcome: Ongoing     Problem: Fluid Volume - Imbalance:  Goal: Absence of imbalanced fluid volume signs and symptoms  Description: Absence of imbalanced fluid volume signs and symptoms  Outcome: Ongoing  Goal: Absence of intrapartum hemorrhage signs and symptoms  Description: Absence of intrapartum hemorrhage signs and symptoms  Outcome: Ongoing     Problem: Infection - Intrapartum Infection:  Goal: Will show no infection signs and symptoms  Description: Will show no infection signs and symptoms  Outcome: Ongoing     Problem: Labor Process - Prolonged:  Goal: Labor progression, first stage, within specified pattern  Description: Labor progression, first stage, within specified pattern  Outcome: Ongoing  Goal: Labor progession, second stage, within specified pattern  Description: Labor progession, second stage, within specified pattern  Outcome: Ongoing  Goal: Uterine contractions within specified parameters  Description: Uterine contractions within specified parameters  Outcome: Ongoing     Problem:  Screening:  Goal: Ability to make informed decisions regarding treatment has improved  Description: Ability to make informed decisions regarding treatment has improved  Outcome: Ongoing     Problem: Pain - Acute:  Goal: Pain level will decrease  Description: Pain level will decrease  Outcome: Ongoing  Goal: Able to cope with pain  Description: Able to cope with pain  Outcome: Ongoing     Problem: Tissue Perfusion - Uteroplacental, Altered:  Goal: Absence of abnormal fetal heart rate pattern  Description: Absence of abnormal fetal heart rate pattern  Outcome: Ongoing     Problem: Urinary Retention:  Goal: Experiences of bladder distention will decrease  Description: Experiences of bladder distention will decrease  Outcome: Ongoing  Goal: Urinary elimination within specified parameters  Description: Urinary elimination within specified parameters  Outcome: Ongoing     Problem: Pain:  Goal: Pain level will decrease  Description: Pain level will decrease  Outcome: Ongoing  Goal: Control of acute pain  Description: Control of acute pain  Outcome: Ongoing  Goal: Control of chronic pain  Description: Control of chronic pain  Outcome: On    The RN will assess for pain throughout the shift. The RN will utilize the 0-10 pain scale. The RN will remind the pt to alert the RN to any changes in pain. The RN will administer pain medications as ordered.

## 2020-08-22 NOTE — FLOWSHEET NOTE
Following pt's epidural placement, FHR had 4 late decels into the 80s-90s. Pitocin was turned off. Pt was repositioned onto first left side, then right side. IV fluid bolus is currently running and pt is on 10L/min O2 by non-rebreather mask. Dr. Xavier Slater notified by this RN regarding the pt's status, FHR decels, and the interventions taken. Dr. Xavier Slater stated to wait until one hour after the last decel before restarting pitocin. Will continue to monitor.

## 2020-08-22 NOTE — PROGRESS NOTES
Assisted pt to bathroom with assist x2. Pt able to void a small amount into toilet. Pt did not void in measuring device. Pt right leg remains weak. Informed pt to call nurse before trying to get out of bed. Jennie care explained to pt and pt demonstrated positive understanding. Pt transferred to post partum unit via wheelchair with infant in arms. FOB followed with all pt belongings. Report given to Naomy Jane RN. Pt left with VSS and post partum nurse at bedside. Call light within reach.

## 2020-08-23 PROCEDURE — 6370000000 HC RX 637 (ALT 250 FOR IP): Performed by: OBSTETRICS & GYNECOLOGY

## 2020-08-23 PROCEDURE — 1200000000 HC SEMI PRIVATE

## 2020-08-23 RX ADMIN — ACETAMINOPHEN 650 MG: 325 TABLET ORAL at 15:01

## 2020-08-23 RX ADMIN — IBUPROFEN 800 MG: 800 TABLET, FILM COATED ORAL at 10:21

## 2020-08-23 RX ADMIN — DOCUSATE SODIUM 100 MG: 100 CAPSULE, LIQUID FILLED ORAL at 10:21

## 2020-08-23 RX ADMIN — ACETAMINOPHEN 650 MG: 325 TABLET ORAL at 04:16

## 2020-08-23 RX ADMIN — DOCUSATE SODIUM 100 MG: 100 CAPSULE, LIQUID FILLED ORAL at 20:42

## 2020-08-23 RX ADMIN — IBUPROFEN 800 MG: 800 TABLET, FILM COATED ORAL at 18:27

## 2020-08-23 ASSESSMENT — PAIN SCALES - GENERAL
PAINLEVEL_OUTOF10: 2
PAINLEVEL_OUTOF10: 1
PAINLEVEL_OUTOF10: 2
PAINLEVEL_OUTOF10: 1

## 2020-08-23 NOTE — PROGRESS NOTES
[facility] post-partum note    Post-partum Day #1    Subjective:   Question presence of fetal testes  Pain is : Mild  Lochia: thin lochia  Breastfeeding: plans to breastfeed    Objective:  Patient Vitals for the past 8 hrs:   BP Temp Temp src Pulse Resp   08/23/20 0545 104/65 97.2 °F (36.2 °C) Oral 77 18     Abd: soft, non tender  Uterus: firm  Ext: no edema, calves non tender    Lab Results   Component Value Date    WBC 10.0 08/21/2020    HGB 10.6 (L) 08/21/2020    HCT 33.7 (L) 08/21/2020    MCV 75.0 (L) 08/21/2020     08/21/2020            Assessment/Plan:    Planning fetal karyotype  Continue Postpartum care  Discharge today: no  Follow up: 6 weeks

## 2020-08-23 NOTE — ANESTHESIA POSTPROCEDURE EVALUATION
Department of Anesthesiology  Postprocedure Note    Patient: Ayala Brizuela  MRN: [id number]  YOB: 1990  Date of evaluation: 8/23/2020  Time:  10:25 AM     Procedure Summary     Date:  08/22/20 Room / Location:      Anesthesia Start:  0246 Anesthesia Stop:  5755    Procedure:  Labor Analgesia Diagnosis:      Scheduled Providers:   Responsible Provider:  Vito Levine MD    Anesthesia Type:  epidural ASA Status:  2          Anesthesia Type: epidural    Kira Phase I:      Kira Phase II:      Last vitals: Reviewed and per EMR flowsheets. Anesthesia Post Evaluation    Patient location during evaluation: floor  Patient participation: complete - patient participated  Level of consciousness: awake and alert  Pain score: 0  Airway patency: patent  Nausea & Vomiting: no nausea and no vomiting  Complications: no  Cardiovascular status: hemodynamically stable  Respiratory status: room air  Hydration status: stable    Patient s/p epidural for L&D. Pt denies residual numbness post block. Patient is ambulating and voiding without difficulty. Patient denies back pain, headache, paresthesias, n/v or pruritus. Epidural site is free of signs of infection.

## 2020-08-24 VITALS
HEART RATE: 90 BPM | DIASTOLIC BLOOD PRESSURE: 72 MMHG | SYSTOLIC BLOOD PRESSURE: 131 MMHG | OXYGEN SATURATION: 99 % | TEMPERATURE: 98.1 F | RESPIRATION RATE: 18 BRPM | HEIGHT: 66 IN | BODY MASS INDEX: 35.52 KG/M2 | WEIGHT: 221 LBS

## 2020-08-24 PROCEDURE — 6370000000 HC RX 637 (ALT 250 FOR IP): Performed by: OBSTETRICS & GYNECOLOGY

## 2020-08-24 RX ADMIN — BENZOCAINE AND LEVOMENTHOL: 200; 5 SPRAY TOPICAL at 10:49

## 2020-08-24 RX ADMIN — IBUPROFEN 800 MG: 800 TABLET, FILM COATED ORAL at 10:48

## 2020-08-24 RX ADMIN — DOCUSATE SODIUM 100 MG: 100 CAPSULE, LIQUID FILLED ORAL at 10:48

## 2020-08-24 RX ADMIN — IBUPROFEN 800 MG: 800 TABLET, FILM COATED ORAL at 02:50

## 2020-08-24 ASSESSMENT — PAIN SCALES - GENERAL
PAINLEVEL_OUTOF10: 0
PAINLEVEL_OUTOF10: 2

## 2020-08-24 NOTE — PROGRESS NOTES
CLINICAL PHARMACY NOTE: MEDS TO [address] Select Patient?: No  Total # of Prescriptions Filled: 2   The following medications were delivered to the patient:  · Ibuprofen 600mg  · Norco 5-325mg  Total # of Interventions Completed: 0  Time Spent (min): 15    Additional Documentation:    Delivered to Patient    Pradip Tillman CPhT

## 2020-08-24 NOTE — PROGRESS NOTES
Discharge instructions reviewed with patient. All questions answered in regards to Postpartum care and medications reviewed. Discharge instructions signed by patient at this time. Patient will be discharged home in stable condition.

## 2020-08-24 NOTE — PROGRESS NOTES
[facility] post-partum note    Post-partum Day #2    Subjective:  Pain is : Mild  Lochia: staining only  Nausea: None  Bowel Movement/Flatus:  yes  Breastfeeding: plans to breastfeed    Objective:  Patient Vitals for the past 24 hrs:   BP Temp Temp src Pulse Resp   08/24/20 0450 125/79 98.2 °F (36.8 °C) Oral 95 --   08/23/20 2100 130/82 98.1 °F (36.7 °C) Oral 94 18   08/23/20 1300 136/86 97.3 °F (36.3 °C) Oral 87 18      Abdominal exam: soft, nontender, nondistended, no masses or organomegaly.        Lab Results   Component Value Date    WBC 10.0 08/21/2020    HGB 10.6 (L) 08/21/2020    HCT 33.7 (L) 08/21/2020    MCV 75.0 (L) 08/21/2020     08/21/2020          Current Facility-Administered Medications:     sodium chloride flush 0.9 % injection 10 mL, 10 mL, Intravenous, 2 times per day, Toshia Braswell MD, 10 mL at 08/22/20 2032    sodium chloride flush 0.9 % injection 10 mL, 10 mL, Intravenous, PRN, Toshia Braswell MD    acetaminophen (TYLENOL) tablet 650 mg, 650 mg, Oral, Q4H PRN, Salazar Leigh MD, 650 mg at 08/23/20 1501    ibuprofen (ADVIL;MOTRIN) tablet 800 mg, 800 mg, Oral, Q8H, Edelmira Leigh MD, 800 mg at 08/24/20 0250    docusate sodium (COLACE) capsule 100 mg, 100 mg, Oral, BID, Osbaldo Leigh MD, 100 mg at 08/23/20 2042    Tetanus-Diphth-Acell Pertussis (BOOSTRIX) injection 0.5 mL, 0.5 mL, Intramuscular, Prior to discharge, Toshia Braswell MD    lansinoh lanolin ointment, , Topical, PRN, Salazar Leigh MD    benzocaine-menthol (DERMOPLAST) 20-0.5 % spray, , Topical, PRN, Toshia Braswell MD     Assessment/Plan:      Continue Postpartum care  Discharge today: yes  Follow up: 6 weeks

## 2020-08-27 ENCOUNTER — FOLLOWUP TELEPHONE ENCOUNTER (OUTPATIENT)
Dept: OBGYN | Age: 30
End: 2020-08-27

## 2020-09-01 ENCOUNTER — FOLLOWUP TELEPHONE ENCOUNTER (OUTPATIENT)
Dept: OBGYN | Age: 30
End: 2020-09-01